# Patient Record
Sex: FEMALE | Race: WHITE | NOT HISPANIC OR LATINO | URBAN - METROPOLITAN AREA
[De-identification: names, ages, dates, MRNs, and addresses within clinical notes are randomized per-mention and may not be internally consistent; named-entity substitution may affect disease eponyms.]

---

## 2020-11-18 LAB
BLOOD URINE, POC: NEGATIVE
CLARITY, POC UA: CLEAR
COLOR, POC UA: YELLOW
GLUCOSE UR QL STRIP: NEGATIVE
KETONES UR QL STRIP: NEGATIVE
LEUKOCYTE EST, POC UA: NORMAL
NITRITE, POC UA: NEGATIVE
PH, POC UA: 5
PROTEIN, POC: NEGATIVE
SPECIFIC GRAVITY, POC UA: 1.03
UROBILINOGEN, POC UA: NORMAL

## 2022-04-10 ENCOUNTER — HISTORICAL (OUTPATIENT)
Dept: ADMINISTRATIVE | Facility: HOSPITAL | Age: 47
End: 2022-04-10

## 2022-04-25 VITALS
WEIGHT: 148.13 LBS | BODY MASS INDEX: 25.29 KG/M2 | HEIGHT: 64 IN | DIASTOLIC BLOOD PRESSURE: 64 MMHG | SYSTOLIC BLOOD PRESSURE: 108 MMHG

## 2022-09-21 ENCOUNTER — HISTORICAL (OUTPATIENT)
Dept: ADMINISTRATIVE | Facility: HOSPITAL | Age: 47
End: 2022-09-21

## 2025-05-22 ENCOUNTER — OFFICE VISIT (OUTPATIENT)
Dept: OBSTETRICS AND GYNECOLOGY | Facility: CLINIC | Age: 50
End: 2025-05-22
Payer: MEDICAID

## 2025-05-22 VITALS
DIASTOLIC BLOOD PRESSURE: 70 MMHG | BODY MASS INDEX: 25.13 KG/M2 | SYSTOLIC BLOOD PRESSURE: 94 MMHG | HEIGHT: 64 IN | WEIGHT: 147.19 LBS

## 2025-05-22 DIAGNOSIS — R87.610 ATYPICAL SQUAMOUS CELL CHANGES OF UNDETERMINED SIGNIFICANCE (ASCUS) ON CERVICAL CYTOLOGY WITH POSITIVE HIGH RISK HUMAN PAPILLOMA VIRUS (HPV): ICD-10-CM

## 2025-05-22 DIAGNOSIS — Z12.31 BREAST CANCER SCREENING BY MAMMOGRAM: ICD-10-CM

## 2025-05-22 DIAGNOSIS — Z01.419 ROUTINE GYNECOLOGICAL EXAMINATION: Primary | ICD-10-CM

## 2025-05-22 DIAGNOSIS — A63.0 GENITAL WARTS: ICD-10-CM

## 2025-05-22 DIAGNOSIS — R87.810 ATYPICAL SQUAMOUS CELL CHANGES OF UNDETERMINED SIGNIFICANCE (ASCUS) ON CERVICAL CYTOLOGY WITH POSITIVE HIGH RISK HUMAN PAPILLOMA VIRUS (HPV): ICD-10-CM

## 2025-05-22 PROCEDURE — 87661 TRICHOMONAS VAGINALIS AMPLIF: CPT | Performed by: NURSE PRACTITIONER

## 2025-05-22 PROCEDURE — 87491 CHLMYD TRACH DNA AMP PROBE: CPT | Performed by: NURSE PRACTITIONER

## 2025-05-22 PROCEDURE — 87624 HPV HI-RISK TYP POOLED RSLT: CPT | Performed by: NURSE PRACTITIONER

## 2025-05-22 PROCEDURE — 87591 N.GONORRHOEAE DNA AMP PROB: CPT | Performed by: NURSE PRACTITIONER

## 2025-05-22 RX ORDER — IMIQUIMOD 12.5 MG/.25G
CREAM TOPICAL
Qty: 24 PACKET | Refills: 1 | Status: SHIPPED | OUTPATIENT
Start: 2025-05-23

## 2025-05-22 NOTE — PROGRESS NOTES
Chief Complaint: Annual exam    Chief Complaint   Patient presents with    Well Woman     Pt is here for annual GYN exam.        HPI:    49 y.o. F  presents for new patient annual gyn exam.  Last pap  - ASCUS + HR HPV  Not sexually active.  Menses cyclic with no complaints.  Due for MMG.  Has had negative colonoscopy.    Cancer-related family history is negative for Breast cancer, Uterine cancer, Ovarian cancer, and Cervical cancer.       Labs / Significant Studies:  Gyn History:    Menstrual History  Cycle: Yes (lmp:25)  Menarche Age: 2 years  Flow Duration: 5  Flow: Normal  Interval: 28  Intermenstrual Bleeding: No  Dysmenorrhea: No    Menopause  Menopause Age: 2 years    Pap History  Last pap date: 20  Result: (!) Abnormal (ASCUS + HR HPV -GC,CZ)  History of Abnormal Pap: (!) Yes  Treatment used: CKC  HPV Vaccine Completed: No (0/3)    [place]  Sexually Active: No  STI History: Yes (HPV)  Contraception: No    Breast History  Last Breast Imaging Date: Yes  Date: 16 (NEGATIVE)  History of Abnormal Breast Imaging : No    Family History   Problem Relation Name Age of Onset    Breast cancer Neg Hx      Colon cancer Neg Hx      Uterine cancer Neg Hx      Ovarian cancer Neg Hx      Cervical cancer Neg Hx           History reviewed. No pertinent past medical history.  Past Surgical History:   Procedure Laterality Date    ANKLE ARTHROSCOPY W/ OPEN REPAIR      AUGMENTATION OF BREAST       Current Medications[1]    Review of patient's allergies indicates:  No Known Allergies    Social History[2]    Review of Systems:  General/Constitutional: Chills denies. Fatigue/weakness denies. Fever denies. Night sweats denies. Hot flashes denies    Respiratory: Cough denies. Hemoptysis denies. SOB denies. Sputum production denies. Wheezing denies .   Cardiovascular: Chest pain denies . Dizziness denies. Palpitations denies. Swelling in hands/feet denies    Gastrointestinal: Abdominal pain denies.  "Blood in stool denies. Constipation denies. Diarrhea denies. Heartburn denies. Nausea denies. Vomiting denies    Genitourinary: Incontinence denies. Blood in urine denies. Frequent urination denies. Painful urination denies. Urinary urgency denies. Nocturia denies    Gynecologic: Irregular menses denies. Heavy bleeding denies. Painful menses denies. Vaginal discharge denies. Vaginal odor denies. Vaginal itching denies. Vaginal lesion denies. Pelvic pain denies. Decreased libido denies. Vulvar lesion denies. Prolapse of genital organs denies. Painful intercourse denies. Postcoital bleeding denies    Psychiatric: Depression denies. Anxiety denies     Physical Exam:   Vitals:    05/22/25 0939   BP: 94/70   BP Location: Left arm   Patient Position: Sitting   Weight: 66.8 kg (147 lb 3.2 oz)   Height: 5' 4" (1.626 m)       Body mass index is 25.27 kg/m².       Chaperone: present.     General appearance: healthy, well-nourished and well-developed     Psychiatric: Orientation to time, place and person. Normal mood and affect and active, alert     Skin: Appearance: no rashes or lesions.     Neck:   Neck: supple, FROM, trachea midline. and no masses   Thyroid: no enlargement or nodules and non-tender.       Cardiovascular:   Auscultation: RRR and no murmur.   Peripheral Vascular: no varicosities, LLE edema, RLE edema, calf tenderness, and palpable cord and pedal pulses intact.     Lungs:   Respiratory effort: no intercostal retractions or accessory muscle usage.   Auscultation: no wheezing, rales/crackles, or rhonchi and clear to auscultation.     Breast:   Inspection/Palpation: no tenderness, discrete/distinct masses, skin changes, or abnormal secretions. Nipple appearance normal. + implants    Abdomen:   Auscultation/Inspection/Palpation: no hepatomegaly, splenomegaly, masses, tenderness or CVA tenderness and soft, non-distended bowel sounds preset.    Hernia: no palpable hernias.     Female Genitalia:    Vulva: no masses, " tenderness or lesions - numerous genital warts noted    Bladder/Urethra: no urethral discharge or mass, normal meatus, bladder non-distended.    Vagina: no tenderness, erythema, cystocele, rectocele, abnormal vaginal discharge or vesicle(s) or ulcers    Cervix: no discharge, no cervical lacerations noted or motion tenderness and grossly normal    Uterus: normal size and shape and midline, non-tender, and no uterine prolapse.    Adnexa/Parametria: no parametrial tenderness or mass, no adnexal tenderness or ovarian mass.     Lymph Nodes:   Palpation: non tender submandibular nodes, axillary nodes, or inguinal nodes.     Rectal Exam:   Rectum: normal perianal skin.       Assessment:     Problem List[3]    Health Maintenance Due   Topic Date Due    Hepatitis C Screening  Never done    Cervical Cancer Screening  Never done    HIV Screening  Never done    Mammogram  Never done    Hemoglobin A1c (Diabetic Prevention Screening)  Never done    COVID-19 Vaccine (1 - 2024-25 season) Never done     Health Maintenance Topics with due status: Not Due       Topic Last Completion Date    TETANUS VACCINE 03/01/2016    Lipid Panel 08/21/2023    Colorectal Cancer Screening 11/21/2023    RSV Vaccine (Age 60+ and Pregnant patients) Not Due         Plan:    Serene was seen today for well woman.    Diagnoses and all orders for this visit:    Routine gynecological examination  PAP with cultures  Counseled regarding contraceptive options, and need for contraception until no menses for 1 year.  Reviewed calcium needs, exercise, and prevention of osteoporosis.  Healthy diet and light weightbearing exercise if tolerated.  Reviewed normal perimenopausal transition.  Mammogram recommended yearly.  Colonoscopy recommended at age 45.  RTC 1 y   Breast cancer screening by mammogram  -     Mammo Digital Screening Bilat w/ Johan (XPD); Future    Genital warts  Aldara cream 3x/week max 16 weeks  -     imiquimod (ALDARA) 5 % cream; Apply topically 3  (three) times a week.    Atypical squamous cell changes of undetermined significance (ASCUS) on cervical cytology with positive high risk human papilloma virus (HPV)  - HPV is a common viral infection that manifests in some patients as anogenital warts.      - HPV infection is acquired through direct genital contact.     - Educated she may be at risk for other sexually transmitted diseases     - Advised pt on Gardasil vaccine and correct doses to be administered if pt desires.                [1]   Current Outpatient Medications:     [START ON 5/23/2025] imiquimod (ALDARA) 5 % cream, Apply topically 3 (three) times a week., Disp: 24 packet, Rfl: 1  [2]   Social History  Tobacco Use    Smoking status: Never     Passive exposure: Never    Smokeless tobacco: Never   Substance Use Topics    Alcohol use: Not Currently    Drug use: Not Currently   [3] There is no problem list on file for this patient.

## 2025-05-29 ENCOUNTER — RESULTS FOLLOW-UP (OUTPATIENT)
Dept: OBSTETRICS AND GYNECOLOGY | Facility: CLINIC | Age: 50
End: 2025-05-29

## 2025-06-02 ENCOUNTER — TELEPHONE (OUTPATIENT)
Dept: OBSTETRICS AND GYNECOLOGY | Facility: CLINIC | Age: 50
End: 2025-06-02
Payer: MEDICAID

## 2025-06-02 NOTE — PROGRESS NOTES
Contacted pt per Danni Gayle NP for abd pap (HGSIL), informed pt that she will need a Colpo. Pt verbalized understanding. Pt transferred to front staff for scheduling.

## 2025-06-06 NOTE — PROGRESS NOTES
PROCEDURE NOTE    Procedure: Colposcopy    Pre-Procedure Care:   The correct patient was identified. The procedure and risks were explained. Questions were answered. Written informed consent was obtained. The correct side and site were confirmed.    Colposcopy (Pt is here for colposcopy. (Last Pap 25 HGSIL -Hr hpv -gc -cz-tv))    Indication for Procedure:   49 y.o. female  presents for colposcopy secondary to  HSIL with Negative High risk HPV, NEG 16, NEG  18/45 cotesting on 25 .   Reports h/o cryo of cervix 8 years ago.     Previous pap: unknown     Denies any abnormal VB/VD/pelvic/abdominal pain.   Gyn History:    Menstrual History  Cycle: Yes (LMP:25)  Menarche Age: 2 years  Flow Duration: 5  Flow: Normal  Interval: 28  Intermenstrual Bleeding: No  Dysmenorrhea: Yes  Dysmenorrhea Severity : Mild    Menopause  Menopause Age: 2 years    Pap History  Last pap date: 25  Result: (!) Abnormal (HGSIL -HR HPV -GC-CZ-TV)  History of Abnormal Pap: (!) Yes  HPV Vaccine Completed: No    [place]  Sexually Active: No  STI History: No  Contraception: No    Breast History  Last Breast Imaging Date: No  History of Breast Biopsy: No          OB History    Para Term  AB Living   5 5 5   5   SAB IAB Ectopic Multiple Live Births       5      # Outcome Date GA Lbr Ad/2nd Weight Sex Type Anes PTL Lv   5 Term 16   3.175 kg (7 lb) F Vag-Spont EPI  BONI      Complications: Tear of vaginal muscle   4 Term 09/16/10   4.536 kg (10 lb) M Vag-Spont EPI  BONI   3 Term 06   3.175 kg (7 lb) F Vag-Spont EPI  BONI   2 Term 11/10/02   3.175 kg (7 lb) F Vag-Spont EPI  BONI   1 Term 96   3.175 kg (7 lb) F Vag-Spont EPI  BONI       LMP 2025 (Approximate)       Procedure in Detail:   Constitutional: General appearance: healthy, well-nourished and well-developed  Female Genitalia:  Vulva: no masses, atrophy or lesions  Bladder/Urethra: no urethral discharge or mass, normal meatus, bladder    non-distended.  Vagina: no tenderness, erythema, cystocele, rectocele, abnormal  vaginal discharge, or vesicle(s) or ulcers   Cervix: no discharge or cervical motion tenderness and grossly normal    Colposcopy procedure fully reviewed. Patient questions regarding procedure and diagnosis answered. Consent was signed. A speculum was placed into the vagina. The cervix and vagina were painted with acetic acid solution.     The entire T zone was visualized.    Colposcopy revealed the following:  Acetowhite lesions present at 1 o'clock  Mosaicism present  Punctate lesions present  Abnormal vessels absent  Leukoplakia absent  A cervical biopsy was performed.   An ECC was performed.  Hemostasis observed  Patient tolerated procedure well.      Condition:   Stable. Patient tolerated procedure well.     Complications: None      Assessment:   1. HGSIL (high grade squamous intraepithelial lesion) on Pap smear of cervix  -     POCT Urine Pregnancy      Plan:     Consent given, Cvx bx and ECC preformed, tolerated well.  - Advised that mild vaginal discharge may occur for 24hrs and spotting for 48hrs.  - Patient to report passage of clots, onset of profuse bleeding, foul vaginal odor, fever and/or pelvic pain.        RTC 2 weeks for f/u

## 2025-06-11 ENCOUNTER — OFFICE VISIT (OUTPATIENT)
Dept: OBSTETRICS AND GYNECOLOGY | Facility: CLINIC | Age: 50
End: 2025-06-11
Payer: MEDICAID

## 2025-06-11 VITALS
RESPIRATION RATE: 22 BRPM | WEIGHT: 147.19 LBS | HEIGHT: 64 IN | BODY MASS INDEX: 25.13 KG/M2 | HEART RATE: 63 BPM | DIASTOLIC BLOOD PRESSURE: 62 MMHG | SYSTOLIC BLOOD PRESSURE: 108 MMHG

## 2025-06-11 DIAGNOSIS — R87.613 HGSIL (HIGH GRADE SQUAMOUS INTRAEPITHELIAL LESION) ON PAP SMEAR OF CERVIX: Primary | ICD-10-CM

## 2025-06-11 LAB
B-HCG UR QL: NEGATIVE
CTP QC/QA: YES

## 2025-06-11 PROCEDURE — 57454 BX/CURETT OF CERVIX W/SCOPE: CPT | Mod: ,,, | Performed by: OBSTETRICS & GYNECOLOGY

## 2025-06-11 PROCEDURE — 81025 URINE PREGNANCY TEST: CPT | Mod: ,,, | Performed by: OBSTETRICS & GYNECOLOGY

## 2025-06-11 PROCEDURE — 99499 UNLISTED E&M SERVICE: CPT | Mod: ,,, | Performed by: OBSTETRICS & GYNECOLOGY

## 2025-06-17 ENCOUNTER — RESULTS FOLLOW-UP (OUTPATIENT)
Dept: OBSTETRICS AND GYNECOLOGY | Facility: CLINIC | Age: 50
End: 2025-06-17

## 2025-06-25 ENCOUNTER — TELEPHONE (OUTPATIENT)
Dept: OBSTETRICS AND GYNECOLOGY | Facility: CLINIC | Age: 50
End: 2025-06-25

## 2025-06-25 NOTE — TELEPHONE ENCOUNTER
Pt returned call, educated, voiced understanding. Will call to r/s as soon as we speak to KAVYA when he returns to clinic.

## 2025-06-25 NOTE — TELEPHONE ENCOUNTER
Placed call to notify of KAVYA being out of office. Pt has appt today to discuss need for LEEP.     No answer, voicemail left. If returns call, please get me.

## 2025-07-01 NOTE — H&P (VIEW-ONLY)
History & Physical    Subjective     History of Present Illness:  Patient is a 49 y.o. female  with h/o HSIL pap here today for Colpo results.     FINAL DIAGNOSIS   1. CERVIX BIOPSY:   FOCAL MODERATE DYSPLASIA (KEYONA-2, HIGH-GRADE SQUAMOUS INTRAEPITHELIAL LESION).   IMMUNO REACTION RESULTS:   P16:  DEMONSTRATES BLOCK POSITIVITY IN FOCUS OF DYSPLASIA.   CONTROL REACTIONS ARE APPROPRIATE.   2. ENDOCERVIX, CURETTINGS/BIOPSY:   RARE DYSPLASTIC SQUAMOUS CELLS IDENTIFIED (SEE COMMENT)   Comment   #1. The cervix biopsy was cut and examined at multiple levels of sectioning   until the tissue was exhausted.  The patient's previous Pap smear ([id number]) is   concurrently reviewed.  The Pap smear findings correlate with the current cervix   biopsy findings.   #2.  Due to the small size and fragmented nature of the dysplastic squamous   epithelium in the ECC specimen, the dysplasia cannot be graded.       Gyn History:    Menstrual History  Cycle: Yes (LMP: 25)  Menarche Age: 2 years  Flow Duration:  (3-4)  Flow: Normal  Interval:  (monthly)  Intermenstrual Bleeding: No  Dysmenorrhea: No    Menopause  Menopause Age: 2 years    Pap History  Last pap date: 25  Result: (!) Abnormal  History of Abnormal Pap: (!) Yes  Treatment used: Colpo, other (Colpo 25 ~ ECC- Rare Dyplastic Squamous cells ; Cervical Bx- Focal Moderate dyplasia (KEYONA 2, HGSIL);   Hx of Colpo/Cryotherapy in late 20's/early 30's)  HPV Vaccine Completed: No (0/3)    [place]  Sexually Active: No  STI History: Yes  STI Type: Genital warts  Contraception: No    Breast History  Last Breast Imaging Date: No  History of Breast Biopsy: No        Review of patient's allergies indicates:  No Known Allergies    No current outpatient medications on file.     No current facility-administered medications for this visit.       Past Medical History:   Diagnosis Date    Abnormal Pap smear of cervix 2025    HGSIL w/ -HPV    Genital warts      Past  "Surgical History:   Procedure Laterality Date    ANKLE ARTHROSCOPY W/ OPEN REPAIR      AUGMENTATION OF BREAST      COLPOSCOPY      late s /early     COLPOSCOPY, CERVIX AND VAGINA, WITH ENDOCERVICAL CURETTAGE  2025    ECC: Rare Dysplastic Squamous cells ; Cervical Bx- Focal Moderate Dysplasia (KEYONA 2, HGSIL)    CRYOTHERAPY      late 's / early 's     Family History   Problem Relation Name Age of Onset    Breast cancer Neg Hx      Colon cancer Neg Hx      Uterine cancer Neg Hx      Ovarian cancer Neg Hx      Cervical cancer Neg Hx       Social History     Tobacco Use    Smoking status: Former     Current packs/day: 0.00     Average packs/day: 1 pack/day for 29.0 years (29.0 ttl pk-yrs)     Types: Cigarettes     Start date:      Quit date:      Years since quittin.5     Passive exposure: Never    Smokeless tobacco: Never   Substance Use Topics    Alcohol use: Not Currently    Drug use: Not Currently        Review of Systems:  Review of Systems   Respiratory: Negative.     Cardiovascular: Negative.    Gastrointestinal: Negative.    Genitourinary: Negative.           Objective     Vital Signs (Most Recent)  BP: 118/68 (25 1304)  5' 4" (1.626 m)  67.1 kg (148 lb)     Physical Exam:  Physical Exam    Physical Exam  Gen: NAD  Cardio: RRR  Lungs CTA b/l  Abd: Soft, NT  Ext: no CCE         Assessment and Plan   1. Dysplasia of cervix, high grade KEYONA 2  - Case Request Operating Room: LEEP (LOOP ELECTROSURGICAL EXCISION PROCEDURE), COLPOSCOPY  - Vital signs; Standing  - Insert peripheral IV; Standing  - POCT glucose; Standing  - Notify physician if BS > 180 for hysterectomy patients; Standing  - Chlorhexidine (CHG) 2% Wipes; Standing  - Notify Physician/Vital Signs Parameters Urine output less than 0.5mL/kg/hr (with indwelling catheter) or 30 mL/hr (without indwelling catheter) or blood glucose greater than 200 mg/dL; Standing  - Notify physician; Standing  - Notify Physician - Potential Need " of Opioid Reversal; Standing  - Diet NPO; Standing  - Chlorohexidine Gluconate Bath; Standing  - Full code; Standing  - Place in Outpatient; Standing  - Pregnancy, urine rapid; Standing  - Type & Screen Pre Op; Standing    2. HSIL (high grade squamous intraepithelial lesion) on Pap smear of cervix  - Case Request Operating Room: LEEP (LOOP ELECTROSURGICAL EXCISION PROCEDURE), COLPOSCOPY  - Vital signs; Standing  - Insert peripheral IV; Standing  - POCT glucose; Standing  - Notify physician if BS > 180 for hysterectomy patients; Standing  - Chlorhexidine (CHG) 2% Wipes; Standing  - Notify Physician/Vital Signs Parameters Urine output less than 0.5mL/kg/hr (with indwelling catheter) or 30 mL/hr (without indwelling catheter) or blood glucose greater than 200 mg/dL; Standing  - Notify physician; Standing  - Notify Physician - Potential Need of Opioid Reversal; Standing  - Diet NPO; Standing  - Chlorohexidine Gluconate Bath; Standing  - Full code; Standing  - Place in Outpatient; Standing  - Pregnancy, urine rapid; Standing  - Type & Screen Pre Op; Standing        PLAN:      Discussed the spectrum of abnormal pap smears, the relationship to HPV infection, high and low risk HPV types, cervical dysplasia, cervical cancer, and genital warts. Reviewed HPV as a sexually transmitted disease, the natural course of most infections and risk factors for infection.  We discussed ASCCP screening and management guidelines based on Colpo results and need for LEEP procedure at this time. Procedure reviewed in detail.  Educated on risks of procedure including but not limited to pain, fever, bleeding, infection, need for future procedures, incompetent cervix which could lead to PTL, cervical stenosis. Pt agrees with plan of care. Consents signed. NPO after midnight prior to procedure. Plan for LEEP on 7/16/25       RTC post op       This note was transcribed by Shital Alfonso. There may be transcription errors as a result, however  minimal. Effort has been made to ensure accuracy of transcription, but any obvious errors or omissions should be clarified with the author of the document.       I agree with the above documentation.

## 2025-07-01 NOTE — PROGRESS NOTES
History & Physical    Subjective     History of Present Illness:  Patient is a 49 y.o. female  with h/o HSIL pap here today for Colpo results.     FINAL DIAGNOSIS   1. CERVIX BIOPSY:   FOCAL MODERATE DYSPLASIA (KEYONA-2, HIGH-GRADE SQUAMOUS INTRAEPITHELIAL LESION).   IMMUNO REACTION RESULTS:   P16:  DEMONSTRATES BLOCK POSITIVITY IN FOCUS OF DYSPLASIA.   CONTROL REACTIONS ARE APPROPRIATE.   2. ENDOCERVIX, CURETTINGS/BIOPSY:   RARE DYSPLASTIC SQUAMOUS CELLS IDENTIFIED (SEE COMMENT)   Comment   #1. The cervix biopsy was cut and examined at multiple levels of sectioning   until the tissue was exhausted.  The patient's previous Pap smear ([id number]) is   concurrently reviewed.  The Pap smear findings correlate with the current cervix   biopsy findings.   #2.  Due to the small size and fragmented nature of the dysplastic squamous   epithelium in the ECC specimen, the dysplasia cannot be graded.       Gyn History:    Menstrual History  Cycle: Yes (LMP: 25)  Menarche Age: 2 years  Flow Duration:  (3-4)  Flow: Normal  Interval:  (monthly)  Intermenstrual Bleeding: No  Dysmenorrhea: No    Menopause  Menopause Age: 2 years    Pap History  Last pap date: 25  Result: (!) Abnormal  History of Abnormal Pap: (!) Yes  Treatment used: Colpo, other (Colpo 25 ~ ECC- Rare Dyplastic Squamous cells ; Cervical Bx- Focal Moderate dyplasia (KEYONA 2, HGSIL);   Hx of Colpo/Cryotherapy in late 20's/early 30's)  HPV Vaccine Completed: No (0/3)    [place]  Sexually Active: No  STI History: Yes  STI Type: Genital warts  Contraception: No    Breast History  Last Breast Imaging Date: No  History of Breast Biopsy: No        Review of patient's allergies indicates:  No Known Allergies    No current outpatient medications on file.     No current facility-administered medications for this visit.       Past Medical History:   Diagnosis Date    Abnormal Pap smear of cervix 2025    HGSIL w/ -HPV    Genital warts      Past  "Surgical History:   Procedure Laterality Date    ANKLE ARTHROSCOPY W/ OPEN REPAIR      AUGMENTATION OF BREAST      COLPOSCOPY      late s /early     COLPOSCOPY, CERVIX AND VAGINA, WITH ENDOCERVICAL CURETTAGE  2025    ECC: Rare Dysplastic Squamous cells ; Cervical Bx- Focal Moderate Dysplasia (KEYONA 2, HGSIL)    CRYOTHERAPY      late 's / early 's     Family History   Problem Relation Name Age of Onset    Breast cancer Neg Hx      Colon cancer Neg Hx      Uterine cancer Neg Hx      Ovarian cancer Neg Hx      Cervical cancer Neg Hx       Social History     Tobacco Use    Smoking status: Former     Current packs/day: 0.00     Average packs/day: 1 pack/day for 29.0 years (29.0 ttl pk-yrs)     Types: Cigarettes     Start date:      Quit date:      Years since quittin.5     Passive exposure: Never    Smokeless tobacco: Never   Substance Use Topics    Alcohol use: Not Currently    Drug use: Not Currently        Review of Systems:  Review of Systems   Respiratory: Negative.     Cardiovascular: Negative.    Gastrointestinal: Negative.    Genitourinary: Negative.           Objective     Vital Signs (Most Recent)  BP: 118/68 (25 1304)  5' 4" (1.626 m)  67.1 kg (148 lb)     Physical Exam:  Physical Exam    Physical Exam  Gen: NAD  Cardio: RRR  Lungs CTA b/l  Abd: Soft, NT  Ext: no CCE         Assessment and Plan   1. Dysplasia of cervix, high grade KEYONA 2  - Case Request Operating Room: LEEP (LOOP ELECTROSURGICAL EXCISION PROCEDURE), COLPOSCOPY  - Vital signs; Standing  - Insert peripheral IV; Standing  - POCT glucose; Standing  - Notify physician if BS > 180 for hysterectomy patients; Standing  - Chlorhexidine (CHG) 2% Wipes; Standing  - Notify Physician/Vital Signs Parameters Urine output less than 0.5mL/kg/hr (with indwelling catheter) or 30 mL/hr (without indwelling catheter) or blood glucose greater than 200 mg/dL; Standing  - Notify physician; Standing  - Notify Physician - Potential Need " of Opioid Reversal; Standing  - Diet NPO; Standing  - Chlorohexidine Gluconate Bath; Standing  - Full code; Standing  - Place in Outpatient; Standing  - Pregnancy, urine rapid; Standing  - Type & Screen Pre Op; Standing    2. HSIL (high grade squamous intraepithelial lesion) on Pap smear of cervix  - Case Request Operating Room: LEEP (LOOP ELECTROSURGICAL EXCISION PROCEDURE), COLPOSCOPY  - Vital signs; Standing  - Insert peripheral IV; Standing  - POCT glucose; Standing  - Notify physician if BS > 180 for hysterectomy patients; Standing  - Chlorhexidine (CHG) 2% Wipes; Standing  - Notify Physician/Vital Signs Parameters Urine output less than 0.5mL/kg/hr (with indwelling catheter) or 30 mL/hr (without indwelling catheter) or blood glucose greater than 200 mg/dL; Standing  - Notify physician; Standing  - Notify Physician - Potential Need of Opioid Reversal; Standing  - Diet NPO; Standing  - Chlorohexidine Gluconate Bath; Standing  - Full code; Standing  - Place in Outpatient; Standing  - Pregnancy, urine rapid; Standing  - Type & Screen Pre Op; Standing        PLAN:      Discussed the spectrum of abnormal pap smears, the relationship to HPV infection, high and low risk HPV types, cervical dysplasia, cervical cancer, and genital warts. Reviewed HPV as a sexually transmitted disease, the natural course of most infections and risk factors for infection.  We discussed ASCCP screening and management guidelines based on Colpo results and need for LEEP procedure at this time. Procedure reviewed in detail.  Educated on risks of procedure including but not limited to pain, fever, bleeding, infection, need for future procedures, incompetent cervix which could lead to PTL, cervical stenosis. Pt agrees with plan of care. Consents signed. NPO after midnight prior to procedure. Plan for LEEP on 7/16/25       RTC post op       This note was transcribed by Shital Alfonso. There may be transcription errors as a result, however  minimal. Effort has been made to ensure accuracy of transcription, but any obvious errors or omissions should be clarified with the author of the document.       I agree with the above documentation.

## 2025-07-02 ENCOUNTER — OFFICE VISIT (OUTPATIENT)
Dept: OBSTETRICS AND GYNECOLOGY | Facility: CLINIC | Age: 50
End: 2025-07-02
Payer: MEDICAID

## 2025-07-02 VITALS
WEIGHT: 148 LBS | SYSTOLIC BLOOD PRESSURE: 118 MMHG | HEIGHT: 64 IN | BODY MASS INDEX: 25.27 KG/M2 | DIASTOLIC BLOOD PRESSURE: 68 MMHG

## 2025-07-02 DIAGNOSIS — N87.1 DYSPLASIA OF CERVIX, HIGH GRADE CIN 2: Primary | ICD-10-CM

## 2025-07-02 DIAGNOSIS — R87.613 HSIL (HIGH GRADE SQUAMOUS INTRAEPITHELIAL LESION) ON PAP SMEAR OF CERVIX: ICD-10-CM

## 2025-07-02 RX ORDER — KETOROLAC TROMETHAMINE 30 MG/ML
15 INJECTION, SOLUTION INTRAMUSCULAR; INTRAVENOUS
OUTPATIENT
Start: 2025-07-02 | End: 2025-07-02

## 2025-07-02 RX ORDER — DIAZEPAM 5 MG/1
10 TABLET ORAL ONCE
OUTPATIENT
Start: 2025-07-02

## 2025-07-02 RX ORDER — FAMOTIDINE 20 MG/1
20 TABLET, FILM COATED ORAL
OUTPATIENT
Start: 2025-07-02

## 2025-07-02 RX ORDER — MUPIROCIN 20 MG/G
OINTMENT TOPICAL
OUTPATIENT
Start: 2025-07-02

## 2025-07-09 ENCOUNTER — HOSPITAL ENCOUNTER (OUTPATIENT)
Dept: PREADMISSION TESTING | Facility: HOSPITAL | Age: 50
Discharge: HOME | End: 2025-07-09
Attending: OBSTETRICS & GYNECOLOGY
Payer: MEDICAID

## 2025-07-09 VITALS — WEIGHT: 148 LBS | HEIGHT: 64 IN | BODY MASS INDEX: 25.27 KG/M2

## 2025-07-09 RX ORDER — METHOCARBAMOL 500 MG/1
500 TABLET, FILM COATED ORAL
COMMUNITY
Start: 2025-07-05 | End: 2025-07-15

## 2025-07-09 NOTE — DISCHARGE INSTRUCTIONS

## 2025-07-16 ENCOUNTER — HOSPITAL ENCOUNTER (OUTPATIENT)
Facility: HOSPITAL | Age: 50
Discharge: HOME | End: 2025-07-16
Attending: OBSTETRICS & GYNECOLOGY | Admitting: OBSTETRICS & GYNECOLOGY
Payer: MEDICAID

## 2025-07-16 VITALS
HEART RATE: 62 BPM | TEMPERATURE: 98 F | HEIGHT: 64 IN | BODY MASS INDEX: 25.25 KG/M2 | DIASTOLIC BLOOD PRESSURE: 51 MMHG | SYSTOLIC BLOOD PRESSURE: 92 MMHG | RESPIRATION RATE: 20 BRPM | OXYGEN SATURATION: 99 % | WEIGHT: 147.94 LBS

## 2025-07-16 DIAGNOSIS — Z98.890 S/P LEEP: Primary | ICD-10-CM

## 2025-07-16 DIAGNOSIS — N87.1 DYSPLASIA OF CERVIX, HIGH GRADE CIN 2: ICD-10-CM

## 2025-07-16 DIAGNOSIS — R87.613 HSIL (HIGH GRADE SQUAMOUS INTRAEPITHELIAL LESION) ON PAP SMEAR OF CERVIX: ICD-10-CM

## 2025-07-16 LAB
ABORH RETYPE: NORMAL
B-HCG UR QL: NEGATIVE
GROUP & RH: NORMAL
INDIRECT COOMBS: NORMAL
SPECIMEN OUTDATE: NORMAL

## 2025-07-16 PROCEDURE — 63600175 PHARM REV CODE 636 W HCPCS: Mod: JZ,TB | Performed by: OBSTETRICS & GYNECOLOGY

## 2025-07-16 PROCEDURE — 25000003 PHARM REV CODE 250

## 2025-07-16 PROCEDURE — 36415 COLL VENOUS BLD VENIPUNCTURE: CPT | Performed by: OBSTETRICS & GYNECOLOGY

## 2025-07-16 PROCEDURE — 81025 URINE PREGNANCY TEST: CPT | Performed by: OBSTETRICS & GYNECOLOGY

## 2025-07-16 PROCEDURE — 36000707: Performed by: OBSTETRICS & GYNECOLOGY

## 2025-07-16 PROCEDURE — 57460 BX OF CERVIX W/SCOPE LEEP: CPT | Mod: ,,, | Performed by: OBSTETRICS & GYNECOLOGY

## 2025-07-16 PROCEDURE — 36000706: Performed by: OBSTETRICS & GYNECOLOGY

## 2025-07-16 PROCEDURE — 27201423 OPTIME MED/SURG SUP & DEVICES STERILE SUPPLY: Performed by: OBSTETRICS & GYNECOLOGY

## 2025-07-16 PROCEDURE — 71000015 HC POSTOP RECOV 1ST HR: Performed by: OBSTETRICS & GYNECOLOGY

## 2025-07-16 PROCEDURE — 86900 BLOOD TYPING SEROLOGIC ABO: CPT | Performed by: OBSTETRICS & GYNECOLOGY

## 2025-07-16 PROCEDURE — 25000003 PHARM REV CODE 250: Performed by: OBSTETRICS & GYNECOLOGY

## 2025-07-16 PROCEDURE — 71000016 HC POSTOP RECOV ADDL HR: Performed by: OBSTETRICS & GYNECOLOGY

## 2025-07-16 RX ORDER — ONDANSETRON 4 MG/1
8 TABLET, ORALLY DISINTEGRATING ORAL EVERY 8 HOURS PRN
Status: DISCONTINUED | OUTPATIENT
Start: 2025-07-16 | End: 2025-07-16

## 2025-07-16 RX ORDER — DIAZEPAM 5 MG/1
10 TABLET ORAL ONCE
Status: COMPLETED | OUTPATIENT
Start: 2025-07-16 | End: 2025-07-16

## 2025-07-16 RX ORDER — HYDROCODONE BITARTRATE AND ACETAMINOPHEN 5; 325 MG/1; MG/1
1 TABLET ORAL EVERY 4 HOURS PRN
Status: DISCONTINUED | OUTPATIENT
Start: 2025-07-16 | End: 2025-07-16 | Stop reason: HOSPADM

## 2025-07-16 RX ORDER — IBUPROFEN 600 MG/1
600 TABLET, FILM COATED ORAL EVERY 6 HOURS PRN
Status: DISCONTINUED | OUTPATIENT
Start: 2025-07-16 | End: 2025-07-16 | Stop reason: HOSPADM

## 2025-07-16 RX ORDER — KETOROLAC TROMETHAMINE 30 MG/ML
30 INJECTION, SOLUTION INTRAMUSCULAR; INTRAVENOUS
Status: DISCONTINUED | OUTPATIENT
Start: 2025-07-16 | End: 2025-07-16 | Stop reason: HOSPADM

## 2025-07-16 RX ORDER — ONDANSETRON 4 MG/1
8 TABLET, ORALLY DISINTEGRATING ORAL EVERY 8 HOURS PRN
Status: DISCONTINUED | OUTPATIENT
Start: 2025-07-16 | End: 2025-07-16 | Stop reason: HOSPADM

## 2025-07-16 RX ORDER — ACETIC ACID 0.25 G/100ML
IRRIGANT IRRIGATION
Status: DISCONTINUED | OUTPATIENT
Start: 2025-07-16 | End: 2025-07-16 | Stop reason: HOSPADM

## 2025-07-16 RX ORDER — IBUPROFEN 600 MG/1
600 TABLET, FILM COATED ORAL EVERY 6 HOURS PRN
Qty: 120 TABLET | Refills: 2 | Status: SHIPPED | OUTPATIENT
Start: 2025-07-16

## 2025-07-16 RX ORDER — HYDROCODONE BITARTRATE AND ACETAMINOPHEN 5; 325 MG/1; MG/1
1 TABLET ORAL EVERY 4 HOURS PRN
Status: DISCONTINUED | OUTPATIENT
Start: 2025-07-16 | End: 2025-07-16

## 2025-07-16 RX ORDER — HYDROMORPHONE HYDROCHLORIDE 1 MG/ML
1 INJECTION, SOLUTION INTRAMUSCULAR; INTRAVENOUS; SUBCUTANEOUS EVERY 6 HOURS PRN
Status: DISCONTINUED | OUTPATIENT
Start: 2025-07-16 | End: 2025-07-16 | Stop reason: HOSPADM

## 2025-07-16 RX ORDER — LIDOCAINE HYDROCHLORIDE AND EPINEPHRINE 10; 10 UG/ML; MG/ML
INJECTION, SOLUTION INFILTRATION; PERINEURAL
Status: DISCONTINUED | OUTPATIENT
Start: 2025-07-16 | End: 2025-07-16 | Stop reason: HOSPADM

## 2025-07-16 RX ORDER — DIPHENHYDRAMINE HCL 25 MG
25 CAPSULE ORAL EVERY 4 HOURS PRN
Status: DISCONTINUED | OUTPATIENT
Start: 2025-07-16 | End: 2025-07-16 | Stop reason: HOSPADM

## 2025-07-16 RX ORDER — TRIPROLIDINE/PSEUDOEPHEDRINE 2.5MG-60MG
30 TABLET ORAL EVERY 6 HOURS PRN
Qty: 480 ML | Refills: 2 | Status: SHIPPED | OUTPATIENT
Start: 2025-07-16

## 2025-07-16 RX ORDER — KETOROLAC TROMETHAMINE 30 MG/ML
15 INJECTION, SOLUTION INTRAMUSCULAR; INTRAVENOUS
Status: DISCONTINUED | OUTPATIENT
Start: 2025-07-16 | End: 2025-07-16

## 2025-07-16 RX ORDER — DIPHENHYDRAMINE HYDROCHLORIDE 50 MG/ML
25 INJECTION, SOLUTION INTRAMUSCULAR; INTRAVENOUS EVERY 4 HOURS PRN
Status: DISCONTINUED | OUTPATIENT
Start: 2025-07-16 | End: 2025-07-16 | Stop reason: HOSPADM

## 2025-07-16 RX ORDER — MUPIROCIN 20 MG/G
OINTMENT TOPICAL
Status: DISCONTINUED | OUTPATIENT
Start: 2025-07-16 | End: 2025-07-16

## 2025-07-16 RX ORDER — DIPHENHYDRAMINE HCL 25 MG
25 CAPSULE ORAL EVERY 4 HOURS PRN
Status: DISCONTINUED | OUTPATIENT
Start: 2025-07-16 | End: 2025-07-16

## 2025-07-16 RX ORDER — OXYCODONE AND ACETAMINOPHEN 10; 325 MG/1; MG/1
1 TABLET ORAL EVERY 4 HOURS PRN
Status: DISCONTINUED | OUTPATIENT
Start: 2025-07-16 | End: 2025-07-16 | Stop reason: HOSPADM

## 2025-07-16 RX ORDER — FAMOTIDINE 20 MG/1
20 TABLET, FILM COATED ORAL
Status: DISCONTINUED | OUTPATIENT
Start: 2025-07-16 | End: 2025-07-16

## 2025-07-16 RX ADMIN — KETOROLAC TROMETHAMINE 30 MG: 30 INJECTION, SOLUTION INTRAMUSCULAR at 06:07

## 2025-07-16 RX ADMIN — DIAZEPAM 10 MG: 5 TABLET ORAL at 06:07

## 2025-07-16 NOTE — PLAN OF CARE
PT IS BACK TO ROOM, AWAKE AND ALERT, IN STABLE CONDITION, NO DIFFICULTY BREATHING OR SWALLOWING. MICHELLE-PAD IN PLACE. NO VAGINAL BLEEDING PRESENT AT THIS TIME. PT IS TOLERATING LIQUIDS, FAMILY AT SIDE, SR X2, CB IN REACH

## 2025-07-16 NOTE — DISCHARGE SUMMARY
[facility]  Discharge Note  Short Stay    Procedure(s) (LRB):  LEEP (LOOP ELECTROSURGICAL EXCISION PROCEDURE) (N/A)  COLPOSCOPY (N/A)      OUTCOME: Patient tolerated treatment/procedure well without complication and is now ready for discharge.    DISPOSITION: Home or Self Care    FINAL DIAGNOSIS:  Dysplasia of cervix, high grade KEYONA 2    FOLLOWUP: In clinic    DISCHARGE INSTRUCTIONS:    Discharge Procedure Orders   Diet general     Diet Adult Regular     Call MD for:  temperature >100.4     Call MD for:  persistent nausea and vomiting     Call MD for:  severe uncontrolled pain     Call MD for:  difficulty breathing, headache or visual disturbances     Call MD for:  redness, tenderness, or signs of infection (pain, swelling, redness, odor or green/yellow discharge around incision site)     Call MD for:  hives     Call MD for:  persistent dizziness or light-headedness     Call MD for:  extreme fatigue     Pelvic Rest   Order Comments: No intercourse for 4 weeks     Lifting restrictions     No driving until:   Order Comments: Off opiods     Pelvic Rest     Notify your health care provider if you experience any of the following:  temperature >100.4     Notify your health care provider if you experience any of the following:  persistent nausea and vomiting or diarrhea     Notify your health care provider if you experience any of the following:  severe uncontrolled pain     Notify your health care provider if you experience any of the following:  difficulty breathing or increased cough     Notify your health care provider if you experience any of the following:  severe persistent headache     Notify your health care provider if you experience any of the following:  worsening rash     Notify your health care provider if you experience any of the following:  persistent dizziness, light-headedness, or visual disturbances     Notify your health care provider if you experience any of the following:   increased confusion or weakness     Activity as tolerated        TIME SPENT ON DISCHARGE: 5 minutes

## 2025-07-16 NOTE — DISCHARGE INSTRUCTIONS
Follow-up with Dr. PATINO     Diet as tolerated.    Decrease your activity today and until after your appointment.    No heavy lifting or straining, no pushing or pulling. (10# limit)    No driving today, or while taking pain medication.    No intercourse, douching, or tampons.    Showers only, no tub baths.    Notify MD of:  Fever over 100.4  Excessive nausea/vomiting  Pain unrelieved by your pain medication  Drainage of yellow/green from sites/vagina

## 2025-07-16 NOTE — OP NOTE
DATE OF PROCEDURE: 7/16/2025    PRE-OP DIAGNOSIS:  Dysplasia of cervix, high grade KEYONA 2 [N87.1]  HSIL (high grade squamous intraepithelial lesion) on Pap smear of cervix [R87.613]    POST-OP DIAGNOSIS:  Post-Op Diagnosis Codes:     * Dysplasia of cervix, high grade KEYONA 2 [N87.1]     * HSIL (high grade squamous intraepithelial lesion) on Pap smear of cervix [R87.613]    PROCEDURE: Loop Electrosurgical Excision Procedure (LEEP)    SURGEON: Shahbaz Velásquez MD    ANESTHESIA: Local    SPECIMEN: Cervix cone biopsy    ESTIMATED BLOOD LOSS: 0    PROCEDURE IN DETAIL:    After consents were reviewed the patient was taken to the operating room where time-out was held.  She was placed on the OR table in the dorsal lithotomy position in Mickey stirrups.  Legs and abdomen were draped.  A nonconductive speculum was placed in the vagina from the cervix was identified.  Ascetic acid soaked cotton ball was used to contact the cervix and this was left in place for about 30-60 seconds.  Colposcopy was performed.  Findings included:  Small aceto-white lesion at the 1 o'clock position, there was also another small area of what appeared to be condyloma around 10 o'clock position.  The entire squamocolumnar junction was visualized.  A single-tooth tenaculum was placed on the anterior cervix.  The cervix was then injected until blanched with 1% lidocaine with epinephrine. A 20 x 12 mm loop electrode was then used a Bovie setting of 45 to excise a cone shaped area of the cervix encompassing the squamocolumnar junction and the visible lesion.  Rollerball cautery was then used to cauterize the cervical bed and Monsel's was placed for further hemostasis the patient was taken down out of stirrups taken to the recovery room in stable condition having tolerated the procedure very well instrument count correct at the end of the case.

## 2025-07-21 LAB — BEAKER SEE SCANNED REPORT: NORMAL

## 2025-07-23 NOTE — PROGRESS NOTES
"HPI:   49 y.o. F s/p LEEP on 7/16/2025  here for postop visit.     Pathology:   Cone bx of cervix at 12:00:  John grade squamous intraepithelial lesion (HSIL); encompassing: moderate and severe dysplasia/KEYONA 2 and KEYONA 3/CIS   Comment: the ectocervical margin is positive at the 12-3:00 position. Other inked margins are free of involvement with dysplasia       Gyn History:    Menstrual History  Cycle: Yes (LMP 7/23/25)  Menarche Age: 2 years  Flow Duration: 4  Flow: Normal  Interval: 28  Intermenstrual Bleeding: No  Dysmenorrhea: No    Menopause  Menopause Age: 2 years    Pap History  Last pap date: 05/22/25  Result: (!) Abnormal (HGSIL)  History of Abnormal Pap: (!) Yes  Treatment used: Colpo, LEEP (Colpo 6/13/25: ECC- Rare Dyplastic Squamous cells ; Cervical Bx- Focal Moderate dyplasia (KEYONA 2, HGSIL))  HPV Vaccine Completed: No    [place]  Sexually Active: No  STI History: Yes  STI Type: Genital warts  Contraception: No    Breast History  Last Breast Imaging Date: No  History of Breast Biopsy: No        Physical Exam:   /70 (BP Location: Right arm, Patient Position: Sitting)   Ht 5' 4" (1.626 m)   Wt 66.7 kg (147 lb)   LMP 07/23/2025 (Within Days)   BMI 25.23 kg/m²   Gen: NAD  Abd: Soft, NT.      Assessment:   1. Dysplasia of cervix, high grade KEYONA 2    2. HSIL (high grade squamous intraepithelial lesion) on Pap smear of cervix    3. S/P LEEP    Leep (loop Electrosurgical Excision Procedure) and Colposcopy       Plan:   Reviewed pathology  May return to normal activity    Reviewed pathology with pt  Discussed CIN3 and positive margins. Discussed treatment options with pt at this time.  May do repeat excisional procedures, repap in 3 months, or hysterectomy.  Will plan for hysterectomy following vacation, [facility] 10/1/25    RTC for pre-op in early September     This note was transcribed by Shital Alfonso. There may be transcription errors as a result, however minimal. Effort has been made to ensure " accuracy of transcription, but any obvious errors or omissions should be clarified with the author of the document.       I agree with the above documentation.

## 2025-07-29 ENCOUNTER — OFFICE VISIT (OUTPATIENT)
Dept: OBSTETRICS AND GYNECOLOGY | Facility: CLINIC | Age: 50
End: 2025-07-29
Payer: MEDICAID

## 2025-07-29 VITALS
SYSTOLIC BLOOD PRESSURE: 118 MMHG | HEIGHT: 64 IN | WEIGHT: 147 LBS | DIASTOLIC BLOOD PRESSURE: 70 MMHG | BODY MASS INDEX: 25.1 KG/M2

## 2025-07-29 DIAGNOSIS — D06.9 HIGH GRADE SQUAMOUS INTRAEPITHELIAL LESION (HGSIL), GRADE 3 CIN, ON BIOPSY OF CERVIX: Primary | ICD-10-CM

## 2025-07-29 DIAGNOSIS — R87.613 HSIL (HIGH GRADE SQUAMOUS INTRAEPITHELIAL LESION) ON PAP SMEAR OF CERVIX: ICD-10-CM

## 2025-07-29 DIAGNOSIS — Z98.890 S/P LEEP: ICD-10-CM

## 2025-07-29 PROCEDURE — 99213 OFFICE O/P EST LOW 20 MIN: CPT | Mod: ,,, | Performed by: OBSTETRICS & GYNECOLOGY

## 2025-07-29 PROCEDURE — 3078F DIAST BP <80 MM HG: CPT | Mod: CPTII,,, | Performed by: OBSTETRICS & GYNECOLOGY

## 2025-07-29 PROCEDURE — 1159F MED LIST DOCD IN RCRD: CPT | Mod: CPTII,,, | Performed by: OBSTETRICS & GYNECOLOGY

## 2025-07-29 PROCEDURE — 3074F SYST BP LT 130 MM HG: CPT | Mod: CPTII,,, | Performed by: OBSTETRICS & GYNECOLOGY

## 2025-07-29 PROCEDURE — 3008F BODY MASS INDEX DOCD: CPT | Mod: CPTII,,, | Performed by: OBSTETRICS & GYNECOLOGY

## 2025-08-18 ENCOUNTER — TELEPHONE (OUTPATIENT)
Dept: OBSTETRICS AND GYNECOLOGY | Facility: CLINIC | Age: 50
End: 2025-08-18
Payer: MEDICAID

## (undated) DEVICE — BLADE SURGICAL GLASSVAN #12

## (undated) DEVICE — TUBE SMOKE EVACUATION .375IN

## (undated) DEVICE — ELECTRODE LOOP 20MMX12MM

## (undated) DEVICE — HEMOSTAT SURGICEL 4X8IN

## (undated) DEVICE — JELLY KY LUBRICATING 5G PACKET

## (undated) DEVICE — NDL SPINAL 18GX3.5 SPINOCAN

## (undated) DEVICE — GLOVE PROTEXIS PI SYN SURG 8.5

## (undated) DEVICE — BLADE SURG CARBON STEEL SZ11

## (undated) DEVICE — ELECTRODE LOOP 15X12X11 TNGSTN

## (undated) DEVICE — NDL SPINAL SPINOCAN 22GX3.5

## (undated) DEVICE — SYR ONLY LUER LOCK 20CC

## (undated) DEVICE — SPONGE GAUZE 16PLY 4X4

## (undated) DEVICE — SKIN MARKER STER DUAL TIP

## (undated) DEVICE — COTTON BALLS 1IN

## (undated) DEVICE — DRESSING TELFA N ADH 3X8

## (undated) DEVICE — DRAPE HALF SURGICAL 40X58IN

## (undated) DEVICE — PENCIL ELECTROSURG HOLST W/BLD

## (undated) DEVICE — SYR 20ML BLUE LUER LOCK

## (undated) DEVICE — SUT 2/0 30IN SILK BLK BRAI

## (undated) DEVICE — CATH URETHRAL RED 16FR